# Patient Record
Sex: FEMALE | Race: WHITE | NOT HISPANIC OR LATINO
[De-identification: names, ages, dates, MRNs, and addresses within clinical notes are randomized per-mention and may not be internally consistent; named-entity substitution may affect disease eponyms.]

---

## 2017-12-04 ENCOUNTER — FORM ENCOUNTER (OUTPATIENT)
Age: 37
End: 2017-12-04

## 2019-01-23 ENCOUNTER — FORM ENCOUNTER (OUTPATIENT)
Age: 39
End: 2019-01-23

## 2020-01-22 ENCOUNTER — FORM ENCOUNTER (OUTPATIENT)
Age: 40
End: 2020-01-22

## 2021-01-15 DIAGNOSIS — Z78.9 OTHER SPECIFIED HEALTH STATUS: ICD-10-CM

## 2021-01-15 PROBLEM — Z00.00 ENCOUNTER FOR PREVENTIVE HEALTH EXAMINATION: Status: ACTIVE | Noted: 2021-01-15

## 2021-01-29 ENCOUNTER — APPOINTMENT (OUTPATIENT)
Dept: BREAST CENTER | Facility: CLINIC | Age: 41
End: 2021-01-29

## 2021-02-04 ENCOUNTER — APPOINTMENT (OUTPATIENT)
Dept: BREAST CENTER | Facility: CLINIC | Age: 41
End: 2021-02-04

## 2021-03-16 PROBLEM — Z87.2 HISTORY OF CYST OF BREAST: Status: RESOLVED | Noted: 2021-03-16 | Resolved: 2021-03-16

## 2021-03-17 ENCOUNTER — APPOINTMENT (OUTPATIENT)
Dept: BREAST CENTER | Facility: CLINIC | Age: 41
End: 2021-03-17
Payer: COMMERCIAL

## 2021-03-17 ENCOUNTER — TRANSCRIPTION ENCOUNTER (OUTPATIENT)
Age: 41
End: 2021-03-17

## 2021-03-17 VITALS
WEIGHT: 124 LBS | BODY MASS INDEX: 22.82 KG/M2 | HEIGHT: 62 IN | HEART RATE: 57 BPM | SYSTOLIC BLOOD PRESSURE: 130 MMHG | DIASTOLIC BLOOD PRESSURE: 83 MMHG

## 2021-03-17 DIAGNOSIS — Z87.2 PERSONAL HISTORY OF DISEASES OF THE SKIN AND SUBCUTANEOUS TISSUE: ICD-10-CM

## 2021-03-17 PROCEDURE — 99214 OFFICE O/P EST MOD 30 MIN: CPT

## 2021-03-17 PROCEDURE — 99072 ADDL SUPL MATRL&STAF TM PHE: CPT

## 2021-03-17 NOTE — PAST MEDICAL HISTORY
[Menarche Age ____] : age at menarche was [unfilled] [Total Preg ___] : G[unfilled] [Live Births ___] : P[unfilled]  [Age At Live Birth ___] : Age at live birth: [unfilled] [Definite ___ (Date)] : the last menstrual period was [unfilled] [FreeTextEntry5] : 3 C-sections [FreeTextEntry2] : Miscarriage 1 [FreeTextEntry6] : No  [FreeTextEntry7] : Yes OCPs [FreeTextEntry8] : yes

## 2021-03-17 NOTE — HISTORY OF PRESENT ILLNESS
[FreeTextEntry1] : 3/17/21 Patient previously seen by Dr. Leos, LOV 1/23/20, followed for bilateral masses & cysts, fam hx of breast cancer on paternal side presents to the office for a new palpable lump in the right breast x 1 wk. Denies skin changes or nipple discharge. \par \par 3/17/21 Baseline MG & US- MG- dense, R- lower central 0.9 cm mass c/w palpable area of concern, L- 0.6 cm and 0.9 cm masses central inner area; US- R- 1.5 cm mass 6:00 3FN c/w palpable area of concern rec US core bx, 1.4 cm mass 9:00 7FN rec 6 mth f/u, focal area of fibrocystic changes measures 5.4 cm 10:00 7FN rec 6 mth f/u, enlarged axillary lymph nodes rec 3 mth f/u, L- probably benign mases at 2:00, 3:30 & 11:30 rec 6 mth f/u, BIRADS 4\par

## 2021-03-17 NOTE — REVIEW OF SYSTEMS
[Fever] : no fever [Chills] : no chills [Skin Lesions] : no skin lesions [Skin Wound] : no skin wound

## 2021-03-17 NOTE — PHYSICAL EXAM
[de-identified] : right breast - 1cm palpable mass jesus manuel to area of patients concern\par Left breast axilla and supraclavicular areas within normal limits

## 2021-03-23 ENCOUNTER — NON-APPOINTMENT (OUTPATIENT)
Age: 41
End: 2021-03-23

## 2021-04-15 ENCOUNTER — NON-APPOINTMENT (OUTPATIENT)
Age: 41
End: 2021-04-15

## 2021-04-16 ENCOUNTER — OUTPATIENT (OUTPATIENT)
Dept: OUTPATIENT SERVICES | Facility: HOSPITAL | Age: 41
LOS: 1 days | Discharge: HOME | End: 2021-04-16

## 2021-04-16 DIAGNOSIS — Z11.59 ENCOUNTER FOR SCREENING FOR OTHER VIRAL DISEASES: ICD-10-CM

## 2021-04-20 ENCOUNTER — RESULT REVIEW (OUTPATIENT)
Age: 41
End: 2021-04-20

## 2021-04-20 ENCOUNTER — APPOINTMENT (OUTPATIENT)
Dept: BREAST CENTER | Facility: AMBULATORY SURGERY CENTER | Age: 41
End: 2021-04-20
Payer: COMMERCIAL

## 2021-04-20 PROCEDURE — 19301 PARTIAL MASTECTOMY: CPT | Mod: RT

## 2021-04-20 PROCEDURE — 14000 TIS TRNFR TRUNK 10 SQ CM/<: CPT | Mod: RT

## 2021-04-20 PROCEDURE — 76098 X-RAY EXAM SURGICAL SPECIMEN: CPT | Mod: 26

## 2021-04-27 PROBLEM — Z86.018 HISTORY OF FIBROADENOMA: Status: RESOLVED | Noted: 2021-01-15 | Resolved: 2021-04-27

## 2021-04-29 ENCOUNTER — APPOINTMENT (OUTPATIENT)
Dept: BREAST CENTER | Facility: CLINIC | Age: 41
End: 2021-04-29
Payer: COMMERCIAL

## 2021-04-29 VITALS
WEIGHT: 123 LBS | SYSTOLIC BLOOD PRESSURE: 136 MMHG | HEIGHT: 62 IN | DIASTOLIC BLOOD PRESSURE: 80 MMHG | BODY MASS INDEX: 22.63 KG/M2 | HEART RATE: 98 BPM

## 2021-04-29 DIAGNOSIS — Z80.3 FAMILY HISTORY OF MALIGNANT NEOPLASM OF BREAST: ICD-10-CM

## 2021-04-29 DIAGNOSIS — Z86.018 PERSONAL HISTORY OF OTHER BENIGN NEOPLASM: ICD-10-CM

## 2021-04-29 PROCEDURE — 99024 POSTOP FOLLOW-UP VISIT: CPT

## 2021-09-28 PROBLEM — Z80.3 FAMILY HISTORY OF MALIGNANT NEOPLASM OF BREAST: Status: ACTIVE | Noted: 2021-04-29

## 2021-09-28 PROBLEM — N63.0 BREAST LUMP OR MASS: Status: ACTIVE | Noted: 2021-01-15

## 2021-09-30 ENCOUNTER — NON-APPOINTMENT (OUTPATIENT)
Age: 41
End: 2021-09-30

## 2021-09-30 ENCOUNTER — APPOINTMENT (OUTPATIENT)
Dept: BREAST CENTER | Facility: CLINIC | Age: 41
End: 2021-09-30
Payer: COMMERCIAL

## 2021-09-30 VITALS
HEIGHT: 62 IN | BODY MASS INDEX: 22.82 KG/M2 | DIASTOLIC BLOOD PRESSURE: 84 MMHG | HEART RATE: 70 BPM | SYSTOLIC BLOOD PRESSURE: 129 MMHG | WEIGHT: 124 LBS

## 2021-09-30 DIAGNOSIS — N63.0 UNSPECIFIED LUMP IN UNSPECIFIED BREAST: ICD-10-CM

## 2021-09-30 DIAGNOSIS — N63.20 UNSPECIFIED LUMP IN THE RIGHT BREAST, UNSPECIFIED QUADRANT: ICD-10-CM

## 2021-09-30 DIAGNOSIS — N63.10 UNSPECIFIED LUMP IN THE RIGHT BREAST, UNSPECIFIED QUADRANT: ICD-10-CM

## 2021-09-30 DIAGNOSIS — Z80.3 FAMILY HISTORY OF MALIGNANT NEOPLASM OF BREAST: ICD-10-CM

## 2021-09-30 DIAGNOSIS — Z86.03 PERSONAL HISTORY OF NEOPLASM OF UNCERTAIN BEHAVIOR: ICD-10-CM

## 2021-09-30 DIAGNOSIS — Z78.9 OTHER SPECIFIED HEALTH STATUS: ICD-10-CM

## 2021-09-30 PROCEDURE — 99213 OFFICE O/P EST LOW 20 MIN: CPT

## 2021-09-30 RX ORDER — CETIRIZINE HCL 10 MG
TABLET ORAL
Refills: 0 | Status: ACTIVE | COMMUNITY

## 2022-04-07 ENCOUNTER — APPOINTMENT (OUTPATIENT)
Dept: BREAST CENTER | Facility: CLINIC | Age: 42
End: 2022-04-07
Payer: COMMERCIAL

## 2022-04-07 ENCOUNTER — RESULT REVIEW (OUTPATIENT)
Age: 42
End: 2022-04-07

## 2022-04-07 VITALS
SYSTOLIC BLOOD PRESSURE: 125 MMHG | WEIGHT: 124 LBS | BODY MASS INDEX: 22.82 KG/M2 | HEART RATE: 76 BPM | DIASTOLIC BLOOD PRESSURE: 76 MMHG | HEIGHT: 62 IN

## 2022-04-07 DIAGNOSIS — R92.2 INCONCLUSIVE MAMMOGRAM: ICD-10-CM

## 2022-04-07 DIAGNOSIS — D24.1 BENIGN NEOPLASM OF RIGHT BREAST: ICD-10-CM

## 2022-04-07 DIAGNOSIS — R92.8 OTHER ABNORMAL AND INCONCLUSIVE FINDINGS ON DIAGNOSTIC IMAGING OF BREAST: ICD-10-CM

## 2022-04-07 DIAGNOSIS — Z78.9 OTHER SPECIFIED HEALTH STATUS: ICD-10-CM

## 2022-04-07 PROCEDURE — 99214 OFFICE O/P EST MOD 30 MIN: CPT

## 2022-04-12 ENCOUNTER — NON-APPOINTMENT (OUTPATIENT)
Age: 42
End: 2022-04-12

## 2022-04-13 ENCOUNTER — APPOINTMENT (OUTPATIENT)
Dept: HEMATOLOGY ONCOLOGY | Facility: CLINIC | Age: 42
End: 2022-04-13

## 2022-04-13 NOTE — DISCUSSION/SUMMARY
[FreeTextEntry1] : The visit was provided via telehealth using real-time 2-way audio visual technology. The patient, Opal Haq, was located at home, in [place], [place] at the time of the visit. The provider, Yi Carrillo, was located at the medical office in [place], [place] at the time of the visit. Verbal consent for telehealth services was given on 22 by the patient, Opal Haq.\par \par REASON FOR CONSULT\par Opal Haq is a 41-year-old female referred by Dr. Oanh Jimenez for cancer genetic counseling and risk assessment due to a new diagnosis of breast cancer. Ms. Haq was seen via telehealth on 2022 at which time medical and family history was ascertained and a pedigree constructed. \par \par RELEVANT MEDICAL HISTORY\par Ms. Haq was diagnosed with right breast DCIS in  at the age of 32. Hormone receptor status is currently pending and she is scheduled to undergo bilateral breast MRI on 22.\par \par OTHER MEDICAL AND SURGICAL HISTORY:\par •	Medical History: no major medical history reported\par •	Surgical History: 3 C-sections, D&C, breast lumpectomy for Phyllodes tumor \par \par OB/GYN HISTORY:\par Obstetrical History: \par Age at Menarche: 14\par Menopausal Status: Premenopausal \par Age at First Live Birth: 31\par Oral Contraceptive Use: Yes, less than 1 year\par Hormone Replacement Therapy: No\par \par CANCER SCREENING HISTORY:  \par Breast: \par •	Mammography: 22- rec Right biopsy\par •	Sonography: 22- rec Right biopsy\par •	MRI: scheduled for 22\par GYN:\par •	Pelvic Examination: Annual- reportedly wnl\par Colon:\par •	Colonoscopy: No\par Skin:  \par •	FBSE: Yes\par •	Lesions biopsied/removed: Yes- abdomen (basal cell)\par \par SOCIAL HISTORY:\par •	Tobacco-product use: No\par •	Environmental exposures: No \par \par FAMILY HISTORY:\par Maternal and paternal ancestry was reported as [place]. Ashkenazi Sikh ancestry was denied. A detailed family history of cancer was ascertained, see below and scanned chart for pedigree. \par \par Of note, Ms. Haq reported her paternal aunt underwent panel genetic testing through [facility] (reportedly 18 genes) 2 years ago due to her history of breast cancer with negative results however a copy of her report was unavailable to review. \par \par According to Ms. Haq no one else in the family has had germline testing for cancer susceptibility. Consanguinity was denied. \par 	\par RISK ASSESSMENT:\par Ms. Haq’s personal and family history is suggestive of a hereditary cancer syndrome given her new diagnosis of breast cancer at age 41, paternal aunt’s history of breast cancer, and paternal grandmother with bilateral breast cancer. The patient meets National Comprehensive Cancer Network (NCCN) criteria for genetic testing. Given that she plans to make surgical decisions based on results from genetic testing, we recommended the [facility] Breast STAT Panel testing for genes associated with breast cancer (typically results within 5-12 days). This test analyzes 9 genes: KWAKU, BRCA1, BRCA2, CDH1, CHEK2, PALB2, PTEN, STK11, and TP53.\par \par The risks, benefits and limitations of genetic testing were discussed with Ms. Haq. In addition, we discussed the purpose of genetic testing and possible test results (positive, negative, inconclusive) along with associated medical management options and psychosocial implications. Insurance coverage and potential out of pocket costs were also discussed. \par \par It was explained that risk assessment is based upon medical and family history as provided and may change in the future should new information be obtained. \par \par Following our discussion, Ms. Haq verbally consented to the above-mentioned genetic testing panel. She is leaving for vacation this Friday so her , Michele, visited the office today and picked up an [employer] saliva kit in order so she can submit a sample before she leaves.\par \par PLAN:\par 1.	Genetic testing was ordered today through [employer] and her  picked up a saliva kit for the patient to submit a sample for genetic testing.\par 2.	We will contact Ms. Haq to schedule a follow-up appointment once the results are available. Results from the STAT panel generally return 5-12 days after the laboratory has received the patient’s saliva sample. \par \par For any additional questions please call Cancer Genetics at [phone]. \par \par \par Yi Carrillo MS, [facility] [place]\par Genetic Counselor, Cancer Genetics\par \par \par CC: \par Oanh Jimenez MD\par \par \par \par

## 2022-04-14 RX ORDER — DIPHENHYDRAMINE HCL 50 MG/1
50 CAPSULE ORAL
Qty: 1 | Refills: 0 | Status: ACTIVE | COMMUNITY
Start: 2022-04-14 | End: 1900-01-01

## 2022-04-14 RX ORDER — PREDNISONE 50 MG/1
50 TABLET ORAL 3 TIMES DAILY
Qty: 3 | Refills: 0 | Status: ACTIVE | COMMUNITY
Start: 2022-04-14 | End: 1900-01-01

## 2022-04-21 ENCOUNTER — RESULT REVIEW (OUTPATIENT)
Age: 42
End: 2022-04-21

## 2022-04-21 ENCOUNTER — OUTPATIENT (OUTPATIENT)
Dept: OUTPATIENT SERVICES | Facility: HOSPITAL | Age: 42
LOS: 1 days | End: 2022-04-21
Payer: COMMERCIAL

## 2022-04-21 ENCOUNTER — APPOINTMENT (OUTPATIENT)
Dept: MRI IMAGING | Facility: HOSPITAL | Age: 42
End: 2022-04-21
Payer: COMMERCIAL

## 2022-04-21 DIAGNOSIS — R92.8 OTHER ABNORMAL AND INCONCLUSIVE FINDINGS ON DIAGNOSTIC IMAGING OF BREAST: ICD-10-CM

## 2022-04-21 DIAGNOSIS — D05.10 INTRADUCTAL CARCINOMA IN SITU OF UNSPECIFIED BREAST: ICD-10-CM

## 2022-04-21 PROCEDURE — C8937: CPT

## 2022-04-21 PROCEDURE — A9585: CPT

## 2022-04-21 PROCEDURE — 77049 MRI BREAST C-+ W/CAD BI: CPT | Mod: 26

## 2022-04-21 PROCEDURE — C8908: CPT

## 2022-04-25 ENCOUNTER — NON-APPOINTMENT (OUTPATIENT)
Age: 42
End: 2022-04-25

## 2022-04-28 ENCOUNTER — APPOINTMENT (OUTPATIENT)
Dept: ULTRASOUND IMAGING | Facility: HOSPITAL | Age: 42
End: 2022-04-28
Payer: COMMERCIAL

## 2022-04-28 ENCOUNTER — RESULT REVIEW (OUTPATIENT)
Age: 42
End: 2022-04-28

## 2022-04-28 ENCOUNTER — OUTPATIENT (OUTPATIENT)
Dept: OUTPATIENT SERVICES | Facility: HOSPITAL | Age: 42
LOS: 1 days | End: 2022-04-28
Payer: COMMERCIAL

## 2022-04-28 PROCEDURE — 76642 ULTRASOUND BREAST LIMITED: CPT | Mod: 26,LT

## 2022-04-28 PROCEDURE — 88305 TISSUE EXAM BY PATHOLOGIST: CPT | Mod: 26

## 2022-04-28 PROCEDURE — 19084 BX BREAST ADD LESION US IMAG: CPT

## 2022-04-28 PROCEDURE — 19084 BX BREAST ADD LESION US IMAG: CPT | Mod: LT

## 2022-04-28 PROCEDURE — 76642 ULTRASOUND BREAST LIMITED: CPT

## 2022-04-28 PROCEDURE — 77065 DX MAMMO INCL CAD UNI: CPT

## 2022-04-28 PROCEDURE — 19083 BX BREAST 1ST LESION US IMAG: CPT | Mod: LT

## 2022-04-28 PROCEDURE — 19083 BX BREAST 1ST LESION US IMAG: CPT

## 2022-04-28 PROCEDURE — 77065 DX MAMMO INCL CAD UNI: CPT | Mod: 26,LT

## 2022-04-28 PROCEDURE — A4648: CPT

## 2022-04-28 PROCEDURE — 88305 TISSUE EXAM BY PATHOLOGIST: CPT

## 2022-04-29 LAB — SURGICAL PATHOLOGY STUDY: SIGNIFICANT CHANGE UP

## 2022-05-04 ENCOUNTER — NON-APPOINTMENT (OUTPATIENT)
Age: 42
End: 2022-05-04

## 2022-05-04 NOTE — DISCUSSION/SUMMARY
[FreeTextEntry1] : REASON FOR CONSULT\par Opal Haq is a 41-year-old female who was contacted on May 4, 2022 for a discussion regarding her negative genetic testing results related to hereditary cancer predisposition. This session was conducted via telephone. Genetic counseling , Shantell Greenberg, also participated in this call.\par \par Ms. Haq was originally seen by the Cancer Genetics Service via telehealth on April 13, 2022 for hereditary cancer predisposition risk assessment due to a new diagnosis of breast cancer. At that time, Ms. Haq decided to pursue genetic testing using [facility]’s breast STAT panel.\par \par INTERVAL HISTORY\par Upon completion of the STAT panel, Ms. Haq was re-contacted on April 25, 2022 regarding her results and consented to pursue additional genetic testing for genes associated with breast and gynecological cancers not included on the initial panel.\par \par TEST RESULTS: NEGATIVE\par NO pathogenic (disease-causing) variants or variants of uncertain significance were detected in any of the following genes [19]:  KWAKU, BARD1, BRCA1, BRCA2, BRIP1, CDH1, CHEK2, EPCAM, MLH1, MSH2, MSH6, NF1, PALB2, PMS2, PTEN, RAD51C, RAD51D, STK11, and TP53.\par \par RESULTS INTERPRETATION AND ASSESSMENT:\par Given Ms. Haq’ personal and current reported family history of cancer, and her negative genetic test results, the following screening guidelines and risk-reducing recommendations were discussed:\par \par BREAST: \par •	It was discussed Ms. Haq’ surgical plan should not be impacted by these negative genetic testing results.\par •	Long-term management and surveillance should be based on Ms. Haq’s on- or post-treatment protocol as recommended by her breast care team. \par \par OTHER:\par •	In the absence of other indications, Ms. Haq should practice age-appropriate cancer screening of other organ systems as recommended for the general population.\par \par \par We also discussed the limitations of negative results:\par 1.	The cause of Ms. Haq’ personal and family history of cancer remains unknown. The cancer(s) may have developed randomly, or due to environmental factors.  \par 2.	This negative result does not completely rule out a hereditary basis for the reported personal and/or family history due to limitations in technology or a variant being present in an unidentified gene. \par 3.	Variants in other genes would not be identified by this analysis, so this negative result does not rule out the likelihood of having a mutation in a different hereditary cancer gene or the possibility of ever developing cancer.\par 4.	It is possible there is a hereditary cancer predisposition gene mutation in the family, but the patient did not inherit it. \par \par We informed Ms. Haq that our knowledge of genetics and inherited cancer conditions is changing rapidly. Therefore, we recommended that Ms. Haq contact our office, every 2 to 3 years, to discuss relevant advances in cancer genetics.  We emphasized the importance of re-contacting us with updates regarding her personal and family history of cancer as well as any updates regarding additional cancer genetic test results performed for the patient and/or family members.  Such updates could possibly change our risk assessment and recommendations. \par \par PLAN:\par 1.	These results do not change Ms. Haq’s medical management. Long-term management and surveillance should be based on the patient’s on- or post-treatment protocol as recommended by her breast care team (and general population guidelines for other cancers).\par 2.	Patient informed consult note(s) will be available through their [facility] patient portal and genetic test results will be released via [facility]’s portal.\par 3.	Ms. Haq was encouraged to contact us every 2-3 years to discuss relevant advances in cancer genetics, or sooner if there are any changes in her personal or family history of cancer.\par \par \par For any additional questions please call Cancer Genetics at [phone]. \par \par \par Yi Carrillo MS, [facility] [place]\par Genetic Counselor, Cancer Genetics\par \par \par CC: \par Oanh Jimenez MD\par \par \par \par \par

## 2022-05-11 ENCOUNTER — NON-APPOINTMENT (OUTPATIENT)
Age: 42
End: 2022-05-11

## 2023-02-27 ENCOUNTER — APPOINTMENT (OUTPATIENT)
Dept: NEUROLOGY | Facility: CLINIC | Age: 43
End: 2023-02-27